# Patient Record
Sex: FEMALE | Race: WHITE | NOT HISPANIC OR LATINO | URBAN - METROPOLITAN AREA
[De-identification: names, ages, dates, MRNs, and addresses within clinical notes are randomized per-mention and may not be internally consistent; named-entity substitution may affect disease eponyms.]

---

## 2017-12-05 ENCOUNTER — APPOINTMENT (RX ONLY)
Dept: URBAN - METROPOLITAN AREA CLINIC 12 | Facility: CLINIC | Age: 27
Setting detail: DERMATOLOGY
End: 2017-12-05

## 2017-12-05 DIAGNOSIS — F17.200 NICOTINE DEPENDENCE, UNSPECIFIED, UNCOMPLICATED: ICD-10-CM

## 2017-12-05 DIAGNOSIS — Z41.1 ENCOUNTER FOR COSMETIC SURGERY: ICD-10-CM

## 2017-12-05 PROBLEM — C43.59 MALIGNANT MELANOMA OF OTHER PART OF TRUNK: Status: ACTIVE | Noted: 2017-12-05

## 2017-12-05 PROCEDURE — ? SMOKING CESSATION COUNSELING

## 2017-12-05 PROCEDURE — 99203 OFFICE O/P NEW LOW 30 MIN: CPT | Mod: 25

## 2017-12-05 PROCEDURE — 99406 BEHAV CHNG SMOKING 3-10 MIN: CPT

## 2017-12-05 PROCEDURE — ? PRE-OP WORKLIST

## 2017-12-05 PROCEDURE — ? PHOTOS OBTAINED

## 2017-12-05 PROCEDURE — ? COUNSELING - MELANOMA

## 2017-12-05 PROCEDURE — ? PATIENT SPECIFIC COUNSELING

## 2017-12-05 NOTE — PROCEDURE: PATIENT SPECIFIC COUNSELING
Other (Free Text): Discussed with patient that the type of closure will not be known until Dr. De Leon removes the melanoma.\\nExplained to patient that scars on the chest tend to stretch. Discussed with patient that taping and Silagen are used to try and help prevent scars from stretching. \\nExplained to patient that she will need to stop smoking before her procedure. Also explained to that it is recommended that she stop smoking at least for 2 weeks before and after her surgery. Informed patient that smoking can affect her healing process. Made patient aware that if she continues to smoke from now and through her healing process, her post surgical risks are higher such as necrotic tissue and delayed healing. \\nPatients preoperative appointment was also done at today's visit. \\nCathy will call patient when surgery date is determined.
Detail Level: Detailed

## 2017-12-05 NOTE — HPI: SKIN LESION (MALIGNANT MELANOMA)
How Severe Is Your Melanoma?: moderate
Is This A New Presentation, Or A Follow-Up?: Melanoma
Additional History: Patient was referred by Dr. De Leon

## 2017-12-05 NOTE — PROCEDURE: PRE-OP WORKLIST
Surgeon: Dr. Horvath
Detail Level: Simple
Date Of Surgery: TBD
Surgery Scheduled: Advancement Rotation Flap
Date Of Evaluation: 12/05/2017

## 2017-12-05 NOTE — PROCEDURE: PHOTOS OBTAINED
Detail Level: Detailed
Follow-Up For Additional Photos?: no
Photographed Locations: Photos were obtained of the surgical site(s).

## 2018-01-12 ENCOUNTER — APPOINTMENT (RX ONLY)
Dept: URBAN - METROPOLITAN AREA CLINIC 12 | Facility: CLINIC | Age: 28
Setting detail: DERMATOLOGY
End: 2018-01-12

## 2018-01-12 DIAGNOSIS — Z48.89 ENCOUNTER FOR OTHER SPECIFIED SURGICAL AFTERCARE: ICD-10-CM

## 2018-01-12 PROCEDURE — ? COUNSELING - POST-OP CHECK, LOCAL FLAP

## 2018-01-12 PROCEDURE — ? POST-OP CHECK

## 2018-01-12 PROCEDURE — 99024 POSTOP FOLLOW-UP VISIT: CPT

## 2018-01-12 PROCEDURE — ? PATIENT SPECIFIC COUNSELING

## 2018-01-12 ASSESSMENT — LOCATION SIMPLE DESCRIPTION DERM: LOCATION SIMPLE: CHEST

## 2018-01-12 ASSESSMENT — LOCATION DETAILED DESCRIPTION DERM: LOCATION DETAILED: RIGHT MEDIAL SUPERIOR CHEST

## 2018-01-12 ASSESSMENT — LOCATION ZONE DERM: LOCATION ZONE: TRUNK

## 2018-01-12 NOTE — PROCEDURE: POST-OP CHECK
Wound Evaluated By: Dr. Mick Horvath
Detail Level: Simple
Add Postop Global No-Charge Code (30624)?: yes

## 2018-01-12 NOTE — PROCEDURE: PATIENT SPECIFIC COUNSELING
Detail Level: Zone
Other (Free Text): Informed patient that she is healing very well. There is no sign of infection or hematoma.\\nDiscussed with patient that she can shower as normally.\\nExplained to patient that the scar will fade, but there will always be a scar there.\\nPatient can resume back to normal activity, but she listen to her body.

## 2018-01-19 ENCOUNTER — APPOINTMENT (RX ONLY)
Dept: URBAN - METROPOLITAN AREA CLINIC 12 | Facility: CLINIC | Age: 28
Setting detail: DERMATOLOGY
End: 2018-01-19

## 2018-01-19 DIAGNOSIS — Z48.89 ENCOUNTER FOR OTHER SPECIFIED SURGICAL AFTERCARE: ICD-10-CM

## 2018-01-19 PROCEDURE — ? PATIENT SPECIFIC COUNSELING

## 2018-01-19 PROCEDURE — ? SUTURE REMOVAL

## 2018-01-19 PROCEDURE — ? COUNSELING - POST-OP CHECK, LOCAL FLAP

## 2018-01-19 PROCEDURE — ? POST-OP CHECK

## 2018-01-19 PROCEDURE — ? SET GLOBAL PERIOD

## 2018-01-19 PROCEDURE — 99024 POSTOP FOLLOW-UP VISIT: CPT

## 2018-01-19 ASSESSMENT — LOCATION SIMPLE DESCRIPTION DERM: LOCATION SIMPLE: CHEST

## 2018-01-19 ASSESSMENT — LOCATION ZONE DERM: LOCATION ZONE: TRUNK

## 2018-01-19 ASSESSMENT — LOCATION DETAILED DESCRIPTION DERM: LOCATION DETAILED: RIGHT MEDIAL SUPERIOR CHEST

## 2018-01-19 NOTE — PROCEDURE: PATIENT SPECIFIC COUNSELING
Detail Level: Zone
Other (Free Text): Informed patient that she is healing very well. There is no sign of infection or hematoma.\\nDiscussed with patient that she is to start taping to reduce the redness, and help prevent the scar from thickening.

## 2018-01-19 NOTE — PROCEDURE: POST-OP CHECK
Add Postop Global No-Charge Code (38708)?: yes
Wound Evaluated By: Dr. Mick Horvath
Detail Level: Simple

## 2018-01-23 ENCOUNTER — APPOINTMENT (RX ONLY)
Dept: URBAN - METROPOLITAN AREA CLINIC 12 | Facility: CLINIC | Age: 28
Setting detail: DERMATOLOGY
End: 2018-01-23

## 2018-01-23 DIAGNOSIS — Z48.89 ENCOUNTER FOR OTHER SPECIFIED SURGICAL AFTERCARE: ICD-10-CM

## 2018-01-23 PROBLEM — Z85.820 PERSONAL HISTORY OF MALIGNANT MELANOMA OF SKIN: Status: ACTIVE | Noted: 2018-01-23

## 2018-01-23 PROCEDURE — ? POST-OP CHECK

## 2018-01-23 PROCEDURE — ? COUNSELING - POST-OP CHECK, LOCAL FLAP

## 2018-01-23 PROCEDURE — ? SET GLOBAL PERIOD

## 2018-01-23 PROCEDURE — 99024 POSTOP FOLLOW-UP VISIT: CPT

## 2018-01-23 PROCEDURE — ? PATIENT SPECIFIC COUNSELING

## 2018-01-23 ASSESSMENT — LOCATION SIMPLE DESCRIPTION DERM: LOCATION SIMPLE: CHEST

## 2018-01-23 ASSESSMENT — LOCATION ZONE DERM: LOCATION ZONE: TRUNK

## 2018-01-23 ASSESSMENT — LOCATION DETAILED DESCRIPTION DERM: LOCATION DETAILED: RIGHT MEDIAL SUPERIOR CHEST

## 2018-01-23 NOTE — PROCEDURE: PATIENT SPECIFIC COUNSELING
Other (Free Text): Informed patient that she is healing very well. There is no sign of infection or hematoma. Two residual sutures were removed today. Discussed with patient that she is to continue taping to reduce the redness, and help prevent the scar from thickening. Patient to RTC in one month.
Detail Level: Zone

## 2018-01-23 NOTE — PROCEDURE: POST-OP CHECK
Detail Level: Simple
Wound Evaluated By: Dr. Mick Horvath
Add Postop Global No-Charge Code (24311)?: yes

## 2018-02-27 ENCOUNTER — APPOINTMENT (RX ONLY)
Dept: URBAN - METROPOLITAN AREA CLINIC 12 | Facility: CLINIC | Age: 28
Setting detail: DERMATOLOGY
End: 2018-02-27

## 2018-02-27 DIAGNOSIS — Z48.89 ENCOUNTER FOR OTHER SPECIFIED SURGICAL AFTERCARE: ICD-10-CM

## 2018-02-27 PROCEDURE — ? SET GLOBAL PERIOD

## 2018-02-27 PROCEDURE — ? PATIENT SPECIFIC COUNSELING

## 2018-02-27 PROCEDURE — ? COUNSELING - POST-OP CHECK, LOCAL FLAP

## 2018-02-27 PROCEDURE — 99024 POSTOP FOLLOW-UP VISIT: CPT

## 2018-02-27 PROCEDURE — ? POST-OP CHECK

## 2018-02-27 ASSESSMENT — LOCATION ZONE DERM: LOCATION ZONE: TRUNK

## 2018-02-27 ASSESSMENT — LOCATION DETAILED DESCRIPTION DERM: LOCATION DETAILED: RIGHT MEDIAL SUPERIOR CHEST

## 2018-02-27 ASSESSMENT — LOCATION SIMPLE DESCRIPTION DERM: LOCATION SIMPLE: CHEST

## 2018-02-27 NOTE — PROCEDURE: POST-OP CHECK
Detail Level: Simple
Add Postop Global No-Charge Code (08570)?: yes
Wound Evaluated By: Dr. Mick Horvath

## 2018-02-27 NOTE — PROCEDURE: PATIENT SPECIFIC COUNSELING
Detail Level: Zone
Other (Free Text): Patient states that the paper tape that we provide is irritating her skin, Dr. Horvath educated patient in trying silicone gel sheeting which should be more sensitive to her skin, patient was also educated on our product silagen which has SPF and will eliminate her having to wear tap every day. Patient was educated on sun protection. Dr. Horvath informed patient that if the creams do not work, preferably 6 weeks, he will try laser treatments to help eliminate the redness. Dr. Horvath mentioned trying our concealer to help mask the redness. Patient was seen by Rashida to discuss make up.

## 2018-04-10 ENCOUNTER — APPOINTMENT (RX ONLY)
Dept: URBAN - METROPOLITAN AREA CLINIC 12 | Facility: CLINIC | Age: 28
Setting detail: DERMATOLOGY
End: 2018-04-10

## 2018-04-10 DIAGNOSIS — Z48.89 ENCOUNTER FOR OTHER SPECIFIED SURGICAL AFTERCARE: ICD-10-CM

## 2018-04-10 DIAGNOSIS — L90.5 SCAR CONDITIONS AND FIBROSIS OF SKIN: ICD-10-CM

## 2018-04-10 PROCEDURE — ? COUNSELING - POST-OP CHECK, LOCAL FLAP

## 2018-04-10 PROCEDURE — ? POST-OP CHECK

## 2018-04-10 PROCEDURE — 99213 OFFICE O/P EST LOW 20 MIN: CPT

## 2018-04-10 PROCEDURE — ? LIMELIGHT IPL

## 2018-04-10 PROCEDURE — ? SET GLOBAL PERIOD

## 2018-04-10 ASSESSMENT — LOCATION ZONE DERM: LOCATION ZONE: TRUNK

## 2018-04-10 ASSESSMENT — LOCATION DETAILED DESCRIPTION DERM
LOCATION DETAILED: RIGHT MEDIAL SUPERIOR CHEST
LOCATION DETAILED: RIGHT LATERAL SUPERIOR CHEST

## 2018-04-10 ASSESSMENT — LOCATION SIMPLE DESCRIPTION DERM: LOCATION SIMPLE: CHEST

## 2018-04-10 NOTE — PROCEDURE: LIMELIGHT IPL
Repetition Rate: 2 Hz
Anesthesia Type: 1% lidocaine with epinephrine
Treatment Number: 1
Detail Level: Zone
Consent: Written consent obtained, risks reviewed including but not limited to crusting, scabbing, blistering, scarring, darker or lighter pigmentary change, bruising, and/or incomplete response.
Fluence: 22
Program: A
Treated Area: medium area
External Cooling Fan Speed: 5
Sun Mode: On
Coolin C
Spot Size(S): 10 x 30 mm

## 2018-05-15 ENCOUNTER — APPOINTMENT (RX ONLY)
Dept: URBAN - METROPOLITAN AREA CLINIC 12 | Facility: CLINIC | Age: 28
Setting detail: DERMATOLOGY
End: 2018-05-15

## 2018-05-15 DIAGNOSIS — L90.5 SCAR CONDITIONS AND FIBROSIS OF SKIN: ICD-10-CM | Status: IMPROVED

## 2018-05-15 PROCEDURE — ? PATIENT SPECIFIC COUNSELING

## 2018-05-15 PROCEDURE — ? LIMELIGHT IPL

## 2018-05-15 PROCEDURE — 99024 POSTOP FOLLOW-UP VISIT: CPT

## 2018-05-15 PROCEDURE — ? COUNSELING - SCAR

## 2018-05-15 PROCEDURE — ? CODE 99024 - NO CHARGE POST-OP VISIT

## 2018-05-15 ASSESSMENT — LOCATION ZONE DERM: LOCATION ZONE: TRUNK

## 2018-05-15 ASSESSMENT — LOCATION DETAILED DESCRIPTION DERM: LOCATION DETAILED: RIGHT LATERAL SUPERIOR CHEST

## 2018-05-15 ASSESSMENT — LOCATION SIMPLE DESCRIPTION DERM: LOCATION SIMPLE: CHEST

## 2018-05-15 NOTE — PROCEDURE: LIMELIGHT IPL
Treated Area: medium area
Spot Size(S): 10 x 30 mm
Coolin C
Fluence: 26
Detail Level: Zone
Program: A
Repetition Rate: 2 Hz
Treatment Number: 2
Consent: Written consent obtained, risks reviewed including but not limited to crusting, scabbing, blistering, scarring, darker or lighter pigmentary change, bruising, and/or incomplete response.
Number Of Passes: 1
Anesthesia Type: 1% lidocaine with epinephrine
Sun Mode: On
External Cooling Fan Speed: 5

## 2018-05-15 NOTE — PROCEDURE: PATIENT SPECIFIC COUNSELING
Other (Free Text): Discussed with patient that since her last visit her scar has improved. \\nRecommended to patient that another IPL treatment be done today.
Detail Level: Detailed

## 2018-06-12 ENCOUNTER — APPOINTMENT (RX ONLY)
Dept: URBAN - METROPOLITAN AREA CLINIC 12 | Facility: CLINIC | Age: 28
Setting detail: DERMATOLOGY
End: 2018-06-12

## 2018-06-12 DIAGNOSIS — L90.5 SCAR CONDITIONS AND FIBROSIS OF SKIN: ICD-10-CM

## 2018-06-12 PROCEDURE — 99024 POSTOP FOLLOW-UP VISIT: CPT

## 2018-06-12 PROCEDURE — ? CODE 99024 - NO CHARGE POST-OP VISIT

## 2018-06-12 PROCEDURE — ? COUNSELING - SCAR

## 2018-06-12 PROCEDURE — ? PATIENT SPECIFIC COUNSELING

## 2018-06-12 PROCEDURE — ? LIMELIGHT IPL

## 2018-06-12 ASSESSMENT — LOCATION ZONE DERM: LOCATION ZONE: TRUNK

## 2018-06-12 ASSESSMENT — LOCATION DETAILED DESCRIPTION DERM: LOCATION DETAILED: RIGHT LATERAL SUPERIOR CHEST

## 2018-06-12 ASSESSMENT — LOCATION SIMPLE DESCRIPTION DERM: LOCATION SIMPLE: CHEST

## 2018-06-12 NOTE — PROCEDURE: PATIENT SPECIFIC COUNSELING
Other (Free Text): Patient states that she is having chest pain. Advised patient to mention pain to Dr. De Leon at next follow-up visit. \\nPatients scar is improving with IPL treatment.
Detail Level: Detailed

## 2018-07-17 ENCOUNTER — APPOINTMENT (RX ONLY)
Dept: URBAN - METROPOLITAN AREA CLINIC 12 | Facility: CLINIC | Age: 28
Setting detail: DERMATOLOGY
End: 2018-07-17

## 2018-07-17 DIAGNOSIS — L90.5 SCAR CONDITIONS AND FIBROSIS OF SKIN: ICD-10-CM

## 2018-07-17 PROCEDURE — 99213 OFFICE O/P EST LOW 20 MIN: CPT

## 2018-07-17 PROCEDURE — ? COUNSELING - SCAR

## 2018-07-17 PROCEDURE — ? PATIENT SPECIFIC COUNSELING

## 2018-07-17 NOTE — PROCEDURE: PATIENT SPECIFIC COUNSELING
Detail Level: Detailed
Other (Free Text): Patients chest pain and redness has improved since last visit. She states that Dr. Viera advised her to watch for a residual suture along her scar line. Dr. Horvath numbed the patient today and punctured the skin with an 18 gauge needle in search of the residual suture. Part of the suture was removed today. Dr. Horvath informed pt that if the bump returns, then a biopsy will be performed at next visit. \\nPt to RTC in one month.

## 2018-08-28 ENCOUNTER — APPOINTMENT (RX ONLY)
Dept: URBAN - METROPOLITAN AREA CLINIC 12 | Facility: CLINIC | Age: 28
Setting detail: DERMATOLOGY
End: 2018-08-28

## 2018-08-28 DIAGNOSIS — Z48.89 ENCOUNTER FOR OTHER SPECIFIED SURGICAL AFTERCARE: ICD-10-CM

## 2018-08-28 DIAGNOSIS — L90.5 SCAR CONDITIONS AND FIBROSIS OF SKIN: ICD-10-CM

## 2018-08-28 PROCEDURE — ? POST-OP CHECK

## 2018-08-28 PROCEDURE — ? PATIENT SPECIFIC COUNSELING

## 2018-08-28 PROCEDURE — ? IPL COSMETIC

## 2018-08-28 PROCEDURE — ? COUNSELING - POST-OP CHECK

## 2018-08-28 PROCEDURE — 99212 OFFICE O/P EST SF 10 MIN: CPT

## 2018-08-28 ASSESSMENT — LOCATION DETAILED DESCRIPTION DERM
LOCATION DETAILED: RIGHT LATERAL SUPERIOR CHEST
LOCATION DETAILED: RIGHT MEDIAL SUPERIOR CHEST
LOCATION DETAILED: RIGHT MEDIAL SUPERIOR CHEST

## 2018-08-28 ASSESSMENT — LOCATION SIMPLE DESCRIPTION DERM
LOCATION SIMPLE: CHEST

## 2018-08-28 ASSESSMENT — LOCATION ZONE DERM
LOCATION ZONE: TRUNK

## 2018-08-28 NOTE — PROCEDURE: PATIENT SPECIFIC COUNSELING
Other (Free Text): Patient presents for follow up on her scar, Dr. Horvath checked the area where the remaining stitch was and informed her that everything looks great and she may resume her normal IPL treatments. Dr. Horvath informed patient that at this point he will continue to see Rashida for her following IPL treatments
Detail Level: Detailed

## 2018-08-28 NOTE — PROCEDURE: IPL COSMETIC
Spotsize (Include Units): small sapphire
Map Statment: See attached map for complete details
End-Point And Post-Procedure Care: The procedure continued until mild purpura was noted.  Immediately following the procedure, Phyto Corrective and EltaMD Physical were applied. Post care reviewed with patient.
Indication: scar
Coolin
Detail Level: Simple
Post-Care Instructions: I reviewed with the patient in detail post-care instructions. Patient should stay away from the sun and wear sun protection until treated areas are fully healed.
Wavelength (Include Units): 590
Pre-Procedure Care: Prior to the procedure the patient and all present had protective eyewear in place and a warning sign was placed on the door.
Fluence (Include Units): 22W
Use Map Statement For Sites (Optional): No
Eye Protection: Laser Aid
Treatment Administered By (Optional): BINTA Adame
Consent: Prior to the procedure consent obtained, risks reviewed including but not limited to crusting, scabbing, blistering, scarring, darker or lighter pigmentary change, incidental hair removal, bruising, and/or incomplete removal.

## 2018-09-25 ENCOUNTER — APPOINTMENT (RX ONLY)
Dept: URBAN - METROPOLITAN AREA CLINIC 12 | Facility: CLINIC | Age: 28
Setting detail: DERMATOLOGY
End: 2018-09-25

## 2018-09-25 DIAGNOSIS — Z48.89 ENCOUNTER FOR OTHER SPECIFIED SURGICAL AFTERCARE: ICD-10-CM

## 2018-09-25 PROCEDURE — 11900 INJECT SKIN LESIONS </W 7: CPT

## 2018-09-25 PROCEDURE — ? PATIENT SPECIFIC COUNSELING

## 2018-09-25 PROCEDURE — ? INTRALESIONAL KENALOG

## 2018-09-25 PROCEDURE — ? COUNSELING - POST-OP CHECK

## 2018-09-25 PROCEDURE — ? POST-OP CHECK

## 2018-09-25 ASSESSMENT — LOCATION DETAILED DESCRIPTION DERM
LOCATION DETAILED: RIGHT MEDIAL SUPERIOR CHEST
LOCATION DETAILED: RIGHT MEDIAL SUPERIOR CHEST

## 2018-09-25 ASSESSMENT — LOCATION SIMPLE DESCRIPTION DERM
LOCATION SIMPLE: CHEST
LOCATION SIMPLE: CHEST

## 2018-09-25 ASSESSMENT — LOCATION ZONE DERM
LOCATION ZONE: TRUNK
LOCATION ZONE: TRUNK

## 2018-09-25 NOTE — PROCEDURE: INTRALESIONAL KENALOG
Administered By (Optional): Dr. Horvath
Kenalog Preparation: kenalog
Concentration (Mg/Ml): 10
Expiration Date (Optional): 1/2019
Consent: The risks of atrophy were reviewed with the patient.
Detail Level: Detailed
Total Volume (Ccs): 0.2
Lot # (Optional): [id number]

## 2018-09-25 NOTE — PROCEDURE: PATIENT SPECIFIC COUNSELING
Detail Level: Detailed
Other (Free Text): Patient presents for post op check advancement rotation flap located on her chest. Patient states that she can still feel a stitch, on the inside of her scar. Dr. Horvath examined patients scar and explained that he is not sure if it’s thickening of her scar, or an internal stitch. Dr. Horvath reassured patient that she has nothing to worry about, and Will not cause her any harm. Dr. Horvath suggested injecting a little steroid to help melt down an scar tissue. Patient agreed to the treatment. Dr. Horvath injected kenalog 10 straight 0.2 cc. Dr. Horvath informed patient to follow up with him when she see’s Rashida for IPL.